# Patient Record
Sex: FEMALE | Race: WHITE | NOT HISPANIC OR LATINO | URBAN - METROPOLITAN AREA
[De-identification: names, ages, dates, MRNs, and addresses within clinical notes are randomized per-mention and may not be internally consistent; named-entity substitution may affect disease eponyms.]

---

## 2017-03-10 ENCOUNTER — OUTPATIENT (OUTPATIENT)
Dept: OUTPATIENT SERVICES | Facility: HOSPITAL | Age: 72
LOS: 1 days | End: 2017-03-10

## 2017-06-23 ENCOUNTER — OUTPATIENT (OUTPATIENT)
Dept: OUTPATIENT SERVICES | Facility: HOSPITAL | Age: 72
LOS: 1 days | End: 2017-06-23

## 2017-09-01 ENCOUNTER — EMERGENCY (EMERGENCY)
Facility: HOSPITAL | Age: 72
LOS: 1 days | End: 2017-09-01
Payer: MEDICARE

## 2017-09-01 PROCEDURE — 73060 X-RAY EXAM OF HUMERUS: CPT | Mod: 26,RT

## 2017-09-01 PROCEDURE — 99283 EMERGENCY DEPT VISIT LOW MDM: CPT

## 2017-09-14 ENCOUNTER — OUTPATIENT (OUTPATIENT)
Dept: OUTPATIENT SERVICES | Facility: HOSPITAL | Age: 72
LOS: 1 days | End: 2017-09-14

## 2022-11-16 PROBLEM — Z00.00 ENCOUNTER FOR PREVENTIVE HEALTH EXAMINATION: Status: ACTIVE | Noted: 2022-11-16

## 2023-07-18 ENCOUNTER — APPOINTMENT (OUTPATIENT)
Dept: ORTHOPEDIC SURGERY | Facility: CLINIC | Age: 78
End: 2023-07-18
Payer: MEDICARE

## 2023-07-18 DIAGNOSIS — E11.9 TYPE 2 DIABETES MELLITUS W/OUT COMPLICATIONS: ICD-10-CM

## 2023-07-18 DIAGNOSIS — C80.1 MALIGNANT (PRIMARY) NEOPLASM, UNSPECIFIED: ICD-10-CM

## 2023-07-18 PROCEDURE — 99203 OFFICE O/P NEW LOW 30 MIN: CPT

## 2023-07-18 PROCEDURE — 99213 OFFICE O/P EST LOW 20 MIN: CPT

## 2023-07-18 RX ORDER — METHYLPREDNISOLONE 4 MG/1
4 TABLET ORAL
Qty: 1 | Refills: 0 | Status: ACTIVE | COMMUNITY
Start: 2023-07-18 | End: 1900-01-01

## 2023-07-18 NOTE — DISCUSSION/SUMMARY
[de-identified] : I discussed her condition and treatment options with patient and her .  I prescribed oral steroids for pain control.  I ordered MRI of the lumbar spine and advised seeing Dr. Llanes afterwards.  Patient voiced understanding and agreement with the plan.\par

## 2023-07-18 NOTE — PHYSICAL EXAM
[] : patient ambulates without assistive device [TWNoteComboBox7] : forward flexion 75 degrees [de-identified] : extension 0 degrees

## 2023-07-18 NOTE — HISTORY OF PRESENT ILLNESS
[de-identified] : Patient presents for evaluation on LT leg pain.  She reports washing her bathtub about 2 weeks ago and the following morning having pain from left low back down the buttock and thigh.  The pain has now progressed down her leg and shin.  She saw her PCP who recommended NSAIDs and muscle relaxant.  She has had minimal help from this.  She also saw chiropractor and reports continued pain.

## 2023-07-20 ENCOUNTER — APPOINTMENT (OUTPATIENT)
Dept: MRI IMAGING | Facility: CLINIC | Age: 78
End: 2023-07-20

## 2023-08-23 ENCOUNTER — APPOINTMENT (OUTPATIENT)
Dept: ORTHOPEDIC SURGERY | Facility: CLINIC | Age: 78
End: 2023-08-23

## 2023-08-28 ENCOUNTER — APPOINTMENT (OUTPATIENT)
Dept: ORTHOPEDIC SURGERY | Facility: CLINIC | Age: 78
End: 2023-08-28
Payer: MEDICARE

## 2023-08-28 PROCEDURE — 99214 OFFICE O/P EST MOD 30 MIN: CPT

## 2023-08-28 NOTE — HISTORY OF PRESENT ILLNESS
[Lower back] : lower back [3] : 3 [Standing] : standing [Walking] : walking [de-identified] : Patient presents today with lower back pain radiating down left leg.  Pt saw Dr Velasquez ( medicine) and had Xrays- states she "does not know where he went"- pt brought printed out photos of Xrays.  Pt reports intermittent pain in left side of back  Reports pain radiating into left leg ending at the knee- states is has improved and used to radiate into the foot.  Reports taking Prednisone and Muscle Relaxers (possible Felxeril- pt can not recall) per PCP with much relief Reports going to [facility] in  ~2-3x/week  Pt denies previous treatments for her back.  Pt is using a cane for ambulation.  [] : no [FreeTextEntry7] : left leg

## 2023-08-28 NOTE — ASSESSMENT
[FreeTextEntry1] : Patient given prescription for MRI, follow up after study is completed to discuss results.  Recommend continue physical therapy to regain range of motion, strengthening and symptomatic improvement.  Continue using anti-inflammatory cream at home PRN  Recommend: - NSAID - Heating pad - Muscle relaxer - Core strengthening exercise - Hamstring stretching exercise Patient is given back rehabilitation exercise book.

## 2023-08-28 NOTE — PHYSICAL EXAM
[TextEntry] : Constitutional: Well groomed and developed.  Respiratory: Normal, unlabored breathing. No use of accessory muscles.  Skin: No rashes or ulcers. Skin warm and dry.  Psychiatric: Oriented to time, place, person and event. No acute distress. Gait: Heel to toe Patient able to walk on toes and heels.    Lumbar spine:  Posture: Normal on coronal and sagittal alignment  AROM:  Flexion 70  Extension 10  Mild pain with simulated truncal motion   Tenderness:  Thoracic: No tenderness on palpation  Lumbar: Mild tenderness on palpation  Sacrum/coccyx: no tenderness on palpation  Greater trochanteric bursa:  TTP on the left  PSIS: None    Motor:                         R             L LE:                                IS                    5             5 Quad              5             5 TA                  5             5 EHL                5             5 Gastroc         5             5                 R  L DTR: Patella  2+  2+ Achilles  2+  2+  Sensory: Light touch sensation intact T12-S1  Babinski's Sign: Negative bilaterally  Straight leg raise test: Negative bilaterally  MOISES test: negative bilaterally

## 2023-09-19 ENCOUNTER — APPOINTMENT (OUTPATIENT)
Dept: MRI IMAGING | Facility: CLINIC | Age: 78
End: 2023-09-19

## 2023-09-21 ENCOUNTER — APPOINTMENT (OUTPATIENT)
Dept: ORTHOPEDIC SURGERY | Facility: CLINIC | Age: 78
End: 2023-09-21
Payer: MEDICARE

## 2023-09-21 DIAGNOSIS — M20.42 OTHER HAMMER TOE(S) (ACQUIRED), RIGHT FOOT: ICD-10-CM

## 2023-09-21 DIAGNOSIS — M20.41 OTHER HAMMER TOE(S) (ACQUIRED), RIGHT FOOT: ICD-10-CM

## 2023-09-21 PROCEDURE — 99203 OFFICE O/P NEW LOW 30 MIN: CPT

## 2023-09-25 ENCOUNTER — APPOINTMENT (OUTPATIENT)
Dept: ORTHOPEDIC SURGERY | Facility: CLINIC | Age: 78
End: 2023-09-25
Payer: MEDICARE

## 2023-09-25 PROCEDURE — 99214 OFFICE O/P EST MOD 30 MIN: CPT

## 2023-12-04 ENCOUNTER — APPOINTMENT (OUTPATIENT)
Dept: ORTHOPEDIC SURGERY | Facility: CLINIC | Age: 78
End: 2023-12-04
Payer: MEDICARE

## 2023-12-04 DIAGNOSIS — M47.817 SPONDYLOSIS W/OUT MYELOPATHY OR RADICULOPATHY, LUMBOSACRAL REGION: ICD-10-CM

## 2023-12-04 DIAGNOSIS — M70.62 TROCHANTERIC BURSITIS, LEFT HIP: ICD-10-CM

## 2023-12-04 DIAGNOSIS — M51.36 OTHER INTERVERTEBRAL DISC DEGENERATION, LUMBAR REGION: ICD-10-CM

## 2023-12-04 DIAGNOSIS — M48.061 SPINAL STENOSIS, LUMBAR REGION WITHOUT NEUROGENIC CLAUDICATION: ICD-10-CM

## 2023-12-04 DIAGNOSIS — M54.16 RADICULOPATHY, LUMBAR REGION: ICD-10-CM

## 2023-12-04 DIAGNOSIS — M51.37 OTHER INTERVERTEBRAL DISC DEGENERATION, LUMBOSACRAL REGION: ICD-10-CM

## 2023-12-04 PROCEDURE — 99214 OFFICE O/P EST MOD 30 MIN: CPT

## 2023-12-06 ENCOUNTER — RX ONLY (RX ONLY)
Age: 78
End: 2023-12-06

## 2023-12-06 ENCOUNTER — OFFICE (OUTPATIENT)
Facility: LOCATION | Age: 78
Setting detail: OPHTHALMOLOGY
End: 2023-12-06
Payer: MEDICARE

## 2023-12-06 DIAGNOSIS — H01.002: ICD-10-CM

## 2023-12-06 DIAGNOSIS — H01.001: ICD-10-CM

## 2023-12-06 DIAGNOSIS — H35.3131: ICD-10-CM

## 2023-12-06 DIAGNOSIS — H01.004: ICD-10-CM

## 2023-12-06 DIAGNOSIS — H16.223: ICD-10-CM

## 2023-12-06 DIAGNOSIS — H40.1132: ICD-10-CM

## 2023-12-06 DIAGNOSIS — E11.3293: ICD-10-CM

## 2023-12-06 DIAGNOSIS — H01.005: ICD-10-CM

## 2023-12-06 PROBLEM — H31.29 PERIPAPILLARY ATROPHY ; BOTH EYES: Status: ACTIVE | Noted: 2023-12-06

## 2023-12-06 PROBLEM — H35.033 HYPERTENSIVE RETINOPATHY; BOTH EYES: Status: ACTIVE | Noted: 2023-12-06

## 2023-12-06 PROBLEM — H35.443 AGE-RELATED RETICULAR DEGENERATION OF RETINA; BOTH EYES: Status: ACTIVE | Noted: 2023-12-06

## 2023-12-06 PROBLEM — H26.491 PCO; RIGHT EYE: Status: ACTIVE | Noted: 2023-12-06

## 2023-12-06 PROBLEM — H11.153 PINGUECULA; BOTH EYES: Status: ACTIVE | Noted: 2023-12-06

## 2023-12-06 PROBLEM — H43.813 VITREOUS DETACHMENT; BOTH EYES: Status: ACTIVE | Noted: 2023-12-06

## 2023-12-06 PROBLEM — Z96.1 PSEUDOPHAKIA ; BOTH EYES: Status: ACTIVE | Noted: 2023-12-06

## 2023-12-06 PROBLEM — H43.393 VITREOUS FLOATERS; BOTH EYES: Status: ACTIVE | Noted: 2023-12-06

## 2023-12-06 PROCEDURE — 92250 FUNDUS PHOTOGRAPHY W/I&R: CPT | Performed by: OPHTHALMOLOGY

## 2023-12-06 PROCEDURE — 92014 COMPRE OPH EXAM EST PT 1/>: CPT | Performed by: OPHTHALMOLOGY

## 2023-12-06 ASSESSMENT — LID EXAM ASSESSMENTS
OD_BLEPHARITIS: T
OS_BLEPHARITIS: T

## 2023-12-06 ASSESSMENT — CONFRONTATIONAL VISUAL FIELD TEST (CVF)
OS_FINDINGS: FULL
OD_FINDINGS: FULL

## 2023-12-06 ASSESSMENT — SUPERFICIAL PUNCTATE KERATITIS (SPK)
OS_SPK: 1+
OD_SPK: 1+

## 2023-12-07 ASSESSMENT — REFRACTION_AUTOREFRACTION
OD_AXIS: 21
OS_AXIS: 7
OD_SPHERE: -1.50
OD_CYLINDER: +1.25
OS_SPHERE: -0.50
OS_CYLINDER: +1.00

## 2023-12-07 ASSESSMENT — REFRACTION_CURRENTRX
OD_SPHERE: -1.25
OD_OVR_VA: 20/
OD_CYLINDER: +1.00
OS_CYLINDER: SPHERE
OD_AXIS: 25
OS_OVR_VA: 20/
OS_SPHERE: PLANO

## 2023-12-07 ASSESSMENT — SPHEQUIV_DERIVED
OS_SPHEQUIV: 0
OD_SPHEQUIV: -0.875

## 2024-03-11 ENCOUNTER — APPOINTMENT (OUTPATIENT)
Dept: ORTHOPEDIC SURGERY | Facility: CLINIC | Age: 79
End: 2024-03-11

## 2024-04-02 ENCOUNTER — OFFICE (OUTPATIENT)
Facility: LOCATION | Age: 79
Setting detail: OPHTHALMOLOGY
End: 2024-04-02
Payer: MEDICARE

## 2024-04-02 DIAGNOSIS — E11.3293: ICD-10-CM

## 2024-04-02 DIAGNOSIS — H35.63: ICD-10-CM

## 2024-04-02 DIAGNOSIS — H35.3131: ICD-10-CM

## 2024-04-02 PROCEDURE — 99213 OFFICE O/P EST LOW 20 MIN: CPT | Performed by: OPHTHALMOLOGY

## 2024-04-02 PROCEDURE — 92134 CPTRZ OPH DX IMG PST SGM RTA: CPT | Performed by: OPHTHALMOLOGY

## 2024-04-02 ASSESSMENT — LID EXAM ASSESSMENTS
OS_BLEPHARITIS: T
OD_BLEPHARITIS: T

## 2024-08-06 ENCOUNTER — OFFICE (OUTPATIENT)
Facility: LOCATION | Age: 79
Setting detail: OPHTHALMOLOGY
End: 2024-08-06
Payer: MEDICARE

## 2024-08-06 DIAGNOSIS — E11.3293: ICD-10-CM

## 2024-08-06 DIAGNOSIS — H40.1132: ICD-10-CM

## 2024-08-06 DIAGNOSIS — H16.223: ICD-10-CM

## 2024-08-06 DIAGNOSIS — H26.491: ICD-10-CM

## 2024-08-06 PROCEDURE — 99213 OFFICE O/P EST LOW 20 MIN: CPT | Performed by: OPHTHALMOLOGY

## 2024-08-06 PROCEDURE — 92133 CPTRZD OPH DX IMG PST SGM ON: CPT | Performed by: OPHTHALMOLOGY

## 2024-08-06 ASSESSMENT — CONFRONTATIONAL VISUAL FIELD TEST (CVF)
OS_FINDINGS: FULL
OD_FINDINGS: FULL

## 2024-08-06 ASSESSMENT — LID EXAM ASSESSMENTS
OS_BLEPHARITIS: T
OD_BLEPHARITIS: T

## 2024-12-06 ENCOUNTER — OFFICE (OUTPATIENT)
Facility: LOCATION | Age: 79
Setting detail: OPHTHALMOLOGY
End: 2024-12-06
Payer: MEDICARE

## 2024-12-06 DIAGNOSIS — H52.13: ICD-10-CM

## 2024-12-06 DIAGNOSIS — E11.3293: ICD-10-CM

## 2024-12-06 DIAGNOSIS — H40.1132: ICD-10-CM

## 2024-12-06 DIAGNOSIS — H35.63: ICD-10-CM

## 2024-12-06 PROCEDURE — 92014 COMPRE OPH EXAM EST PT 1/>: CPT | Performed by: OPHTHALMOLOGY

## 2024-12-06 PROCEDURE — 92015 DETERMINE REFRACTIVE STATE: CPT | Mod: GA | Performed by: OPHTHALMOLOGY

## 2024-12-06 PROCEDURE — 92250 FUNDUS PHOTOGRAPHY W/I&R: CPT | Performed by: OPHTHALMOLOGY

## 2024-12-06 ASSESSMENT — CONFRONTATIONAL VISUAL FIELD TEST (CVF)
OS_FINDINGS: FULL
OD_FINDINGS: FULL

## 2024-12-06 ASSESSMENT — SUPERFICIAL PUNCTATE KERATITIS (SPK)
OD_SPK: 1+
OS_SPK: 1+

## 2024-12-06 ASSESSMENT — LID EXAM ASSESSMENTS
OS_BLEPHARITIS: T
OD_BLEPHARITIS: T

## 2024-12-06 ASSESSMENT — TONOMETRY
OD_IOP_MMHG: 16
OS_IOP_MMHG: 14

## 2024-12-10 ENCOUNTER — OFFICE (OUTPATIENT)
Facility: LOCATION | Age: 79
Setting detail: OPHTHALMOLOGY
End: 2024-12-10
Payer: MEDICARE

## 2024-12-10 DIAGNOSIS — H26.491: ICD-10-CM

## 2024-12-10 PROCEDURE — 66821 AFTER CATARACT LASER SURGERY: CPT | Mod: RT | Performed by: OPHTHALMOLOGY

## 2024-12-10 ASSESSMENT — SUPERFICIAL PUNCTATE KERATITIS (SPK)
OS_SPK: 1+
OD_SPK: 1+

## 2024-12-10 ASSESSMENT — LID EXAM ASSESSMENTS
OS_BLEPHARITIS: T
OD_BLEPHARITIS: T

## 2024-12-10 ASSESSMENT — TONOMETRY: OD_IOP_MMHG: 14

## 2024-12-11 ASSESSMENT — REFRACTION_MANIFEST
OD_CYLINDER: +1.00
OD_SPHERE: -2.00
OD_AXIS: 025
OS_SPHERE: PLANO
OD_VA1: 20/25-2
OS_CYLINDER: SPHERE

## 2024-12-11 ASSESSMENT — KERATOMETRY
OD_AXISANGLE_DEGREES: 026
OS_AXISANGLE_DEGREES: 036
OD_K1POWER_DIOPTERS: 42.50
OD_K2POWER_DIOPTERS: 43.50
OS_K1POWER_DIOPTERS: 42.50
OS_K2POWER_DIOPTERS: 43.00

## 2024-12-11 ASSESSMENT — REFRACTION_AUTOREFRACTION
OS_AXIS: 043
OS_SPHERE: +0.25
OS_CYLINDER: +0.50
OD_CYLINDER: +1.25
OD_SPHERE: -1.25
OD_AXIS: 026

## 2024-12-11 ASSESSMENT — VISUAL ACUITY
OS_BCVA: 20/30-1
OD_BCVA: 20/200

## 2024-12-11 ASSESSMENT — REFRACTION_CURRENTRX
OD_OVR_VA: 20/
OD_AXIS: 25
OD_SPHERE: -1.25
OS_CYLINDER: SPHERE
OD_CYLINDER: +1.00
OS_OVR_VA: 20/
OS_SPHERE: PLANO

## 2024-12-31 PROBLEM — H52.11 MYOPIA; RIGHT EYE: Status: ACTIVE | Noted: 2024-12-06

## 2024-12-31 ASSESSMENT — REFRACTION_CURRENTRX
OS_CYLINDER: SPHERE
OD_OVR_VA: 20/
OD_SPHERE: -1.25
OS_SPHERE: PLANO
OS_OVR_VA: 20/
OD_AXIS: 25
OD_CYLINDER: +1.00

## 2024-12-31 ASSESSMENT — REFRACTION_AUTOREFRACTION
OS_AXIS: 043
OD_CYLINDER: +1.25
OS_SPHERE: +0.25
OD_SPHERE: -1.25
OD_AXIS: 026
OS_CYLINDER: +0.50

## 2024-12-31 ASSESSMENT — REFRACTION_MANIFEST
OS_CYLINDER: SPHERE
OD_AXIS: 025
OD_CYLINDER: +1.00
OS_SPHERE: PLANO
OD_VA1: 20/25-2
OD_SPHERE: -2.00

## 2024-12-31 ASSESSMENT — VISUAL ACUITY
OS_BCVA: 20/30-1
OD_BCVA: 20/400

## 2025-01-16 ENCOUNTER — OFFICE (OUTPATIENT)
Facility: LOCATION | Age: 80
Setting detail: OPHTHALMOLOGY
End: 2025-01-16
Payer: MEDICARE

## 2025-01-16 DIAGNOSIS — H26.491: ICD-10-CM

## 2025-01-16 PROCEDURE — 99024 POSTOP FOLLOW-UP VISIT: CPT | Performed by: OPHTHALMOLOGY

## 2025-01-16 ASSESSMENT — CONFRONTATIONAL VISUAL FIELD TEST (CVF)
OD_FINDINGS: FULL
OS_FINDINGS: FULL

## 2025-01-16 ASSESSMENT — TONOMETRY: OD_IOP_MMHG: 19

## 2025-01-16 ASSESSMENT — LID EXAM ASSESSMENTS
OD_BLEPHARITIS: T
OS_BLEPHARITIS: T

## 2025-01-16 ASSESSMENT — SUPERFICIAL PUNCTATE KERATITIS (SPK)
OS_SPK: 1+
OD_SPK: 1+

## 2025-01-18 ASSESSMENT — VISUAL ACUITY
OS_BCVA: 20/25+2
OD_BCVA: 20/CF

## 2025-01-18 ASSESSMENT — REFRACTION_AUTOREFRACTION
OD_SPHERE: -1.25
OD_AXIS: 026
OS_SPHERE: +0.25
OS_AXIS: 043
OS_CYLINDER: +0.50
OD_CYLINDER: +1.25

## 2025-01-18 ASSESSMENT — REFRACTION_CURRENTRX
OD_AXIS: 25
OD_OVR_VA: 20/
OD_CYLINDER: +1.00
OS_CYLINDER: SPHERE
OS_SPHERE: PLANO
OD_SPHERE: -1.25
OS_OVR_VA: 20/

## 2025-01-18 ASSESSMENT — REFRACTION_MANIFEST
OS_SPHERE: PLANO
OD_AXIS: 025
OS_CYLINDER: SPHERE
OD_VA1: 20/25-2
OD_CYLINDER: +1.00
OD_SPHERE: -2.00

## 2025-01-18 ASSESSMENT — KERATOMETRY
OD_K2POWER_DIOPTERS: 43.50
OD_K1POWER_DIOPTERS: 42.50
OD_AXISANGLE_DEGREES: 026
OS_K1POWER_DIOPTERS: 42.50
OS_K2POWER_DIOPTERS: 43.00
OS_AXISANGLE_DEGREES: 036

## 2025-04-28 ENCOUNTER — APPOINTMENT (OUTPATIENT)
Dept: ORTHOPEDIC SURGERY | Facility: CLINIC | Age: 80
End: 2025-04-28
Payer: MEDICARE

## 2025-04-28 DIAGNOSIS — M47.817 SPONDYLOSIS W/OUT MYELOPATHY OR RADICULOPATHY, LUMBOSACRAL REGION: ICD-10-CM

## 2025-04-28 DIAGNOSIS — M51.379 OTH INTVRT DISC DEGEN, LUMBOSACR W/O LUM BCK OR LW EXTRM PN: ICD-10-CM

## 2025-04-28 DIAGNOSIS — M51.369: ICD-10-CM

## 2025-04-28 DIAGNOSIS — M48.061 SPINAL STENOSIS, LUMBAR REGION WITHOUT NEUROGENIC CLAUDICATION: ICD-10-CM

## 2025-04-28 DIAGNOSIS — M70.62 TROCHANTERIC BURSITIS, LEFT HIP: ICD-10-CM

## 2025-04-28 PROCEDURE — 99214 OFFICE O/P EST MOD 30 MIN: CPT

## 2025-04-28 PROCEDURE — 72100 X-RAY EXAM L-S SPINE 2/3 VWS: CPT

## 2025-04-28 RX ORDER — DAPAGLIFLOZIN 10 MG/1
TABLET, FILM COATED ORAL
Refills: 0 | Status: ACTIVE | COMMUNITY

## 2025-04-28 RX ORDER — EZETIMIBE 10 MG/1
10 TABLET ORAL
Refills: 0 | Status: ACTIVE | COMMUNITY

## 2025-04-28 RX ORDER — METFORMIN HYDROCHLORIDE 500 MG/1
500 TABLET, COATED ORAL
Refills: 0 | Status: ACTIVE | COMMUNITY

## 2025-04-28 RX ORDER — LISINOPRIL 20 MG/1
20 TABLET ORAL
Refills: 0 | Status: ACTIVE | COMMUNITY

## 2025-04-28 RX ORDER — LEVOTHYROXINE SODIUM 75 UG/1
75 CAPSULE ORAL
Refills: 0 | Status: ACTIVE | COMMUNITY

## 2025-04-28 RX ORDER — ATORVASTATIN CALCIUM 80 MG/1
TABLET, FILM COATED ORAL
Refills: 0 | Status: ACTIVE | COMMUNITY

## 2025-07-18 ENCOUNTER — OFFICE (OUTPATIENT)
Facility: LOCATION | Age: 80
Setting detail: OPHTHALMOLOGY
End: 2025-07-18
Payer: MEDICARE

## 2025-07-18 DIAGNOSIS — E11.3293: ICD-10-CM

## 2025-07-18 DIAGNOSIS — H35.3131: ICD-10-CM

## 2025-07-18 DIAGNOSIS — H40.1132: ICD-10-CM

## 2025-07-18 PROBLEM — H35.61 RETINAL HEMORRHAGE; RIGHT EYE: Status: ACTIVE | Noted: 2025-07-18

## 2025-07-18 PROCEDURE — 92250 FUNDUS PHOTOGRAPHY W/I&R: CPT | Performed by: OPHTHALMOLOGY

## 2025-07-18 PROCEDURE — 92014 COMPRE OPH EXAM EST PT 1/>: CPT | Performed by: OPHTHALMOLOGY

## 2025-07-18 ASSESSMENT — REFRACTION_AUTOREFRACTION
OS_CYLINDER: +0.50
OS_SPHERE: PLANO
OD_SPHERE: -1.25
OS_AXIS: 31
OD_CYLINDER: +1.00
OD_AXIS: 39

## 2025-07-18 ASSESSMENT — VISUAL ACUITY
OD_BCVA: 20/CF
OS_BCVA: 20/25+2

## 2025-07-18 ASSESSMENT — SUPERFICIAL PUNCTATE KERATITIS (SPK)
OD_SPK: 1+
OS_SPK: 1+

## 2025-07-18 ASSESSMENT — REFRACTION_MANIFEST
OD_SPHERE: -2.00
OD_CYLINDER: +1.00
OD_VA1: 20/25-2
OD_AXIS: 025
OS_CYLINDER: SPHERE
OS_SPHERE: PLANO

## 2025-07-18 ASSESSMENT — LID EXAM ASSESSMENTS
OD_BLEPHARITIS: T
OS_BLEPHARITIS: T

## 2025-07-18 ASSESSMENT — REFRACTION_CURRENTRX
OD_SPHERE: -1.25
OS_CYLINDER: SPHERE
OS_OVR_VA: 20/
OD_CYLINDER: +1.00
OS_SPHERE: PLANO
OD_AXIS: 29
OS_VPRISM_DIRECTION: SV
OD_OVR_VA: 20/
OD_VPRISM_DIRECTION: SV

## 2025-07-18 ASSESSMENT — KERATOMETRY
OS_AXISANGLE_DEGREES: 036
OD_K1POWER_DIOPTERS: 42.50
OS_K1POWER_DIOPTERS: 42.50
OD_AXISANGLE_DEGREES: 026
OD_K2POWER_DIOPTERS: 43.50
OS_K2POWER_DIOPTERS: 43.00

## 2025-07-18 ASSESSMENT — TONOMETRY
OS_IOP_MMHG: 19
OD_IOP_MMHG: 19

## 2025-07-18 ASSESSMENT — CONFRONTATIONAL VISUAL FIELD TEST (CVF)
OD_FINDINGS: FULL
OS_FINDINGS: FULL